# Patient Record
Sex: FEMALE | Race: BLACK OR AFRICAN AMERICAN | NOT HISPANIC OR LATINO | Employment: FULL TIME | URBAN - METROPOLITAN AREA
[De-identification: names, ages, dates, MRNs, and addresses within clinical notes are randomized per-mention and may not be internally consistent; named-entity substitution may affect disease eponyms.]

---

## 2017-04-10 ENCOUNTER — HOSPITAL ENCOUNTER (EMERGENCY)
Facility: HOSPITAL | Age: 22
Discharge: HOME | End: 2017-04-10
Attending: EMERGENCY MEDICINE
Payer: COMMERCIAL

## 2017-04-10 VITALS
RESPIRATION RATE: 18 BRPM | SYSTOLIC BLOOD PRESSURE: 134 MMHG | BODY MASS INDEX: 28.52 KG/M2 | DIASTOLIC BLOOD PRESSURE: 93 MMHG | HEIGHT: 62 IN | OXYGEN SATURATION: 100 % | HEART RATE: 84 BPM | TEMPERATURE: 98 F | WEIGHT: 155 LBS

## 2017-04-10 DIAGNOSIS — R10.9 ABDOMINAL PAIN, UNSPECIFIED LOCATION: Primary | ICD-10-CM

## 2017-04-10 PROCEDURE — 99284 EMERGENCY DEPT VISIT MOD MDM: CPT | Mod: ,,, | Performed by: PHYSICIAN ASSISTANT

## 2017-04-10 PROCEDURE — 99283 EMERGENCY DEPT VISIT LOW MDM: CPT

## 2017-04-10 NOTE — ED AVS SNAPSHOT
[facility]  [address]  [facility] [address]               Tammie Child   4/10/2017  8:02 PM   ED    Description:  Female : 1995   Department:  [facility]           Your Care was Coordinated By:     Provider Role From To    Mike Barlow MD Attending Provider 04/10/17 2021 --    Veronika Carter PA-C Physician Assistant 04/10/17 2020 --      Reason for Visit     Abdominal Pain           Diagnoses this Visit        Comments    Abdominal pain, unspecified location    -  Primary       ED Disposition     None           To Do List           Follow-up Information     Follow up with [facility]. Schedule an appointment as soon as possible for a visit in 1 week.    Specialty:  Internal Medicine    Contact information:    [address]  [facility] [id number] [phone]    Additional information:    [facility]      [facility]     [facility] Care [facility] -  Assistance  Unless otherwise directed by your provider, please contact [facility], our nurse care line that is available for  assistance.     Registered nurses in the [facility] provide: appointment scheduling, clinical advisement, health education, and other advisory services.  Call: [phone] (toll free)               Medications           Message regarding Medications     Verify the changes and/or additions to your medication regime listed below are the same as discussed with your clinician today.  If any of these changes or additions are incorrect, please notify your healthcare provider.             Verify that the below list of medications is an accurate representation of the medications you are currently taking.  If none reported, the list may be blank. If incorrect, please contact your healthcare provider. Carry this list with you in case of emergency.          "  Current Medications     albuterol 90 mcg/actuation inhaler Inhale 1-2 puffs into the lungs every 6 (six) hours as needed for Wheezing.           Clinical Reference Information           Your Vitals Were     BP Pulse Temp Resp Height Weight    126/71 (BP Location: Right arm, Patient Position: Sitting) 81 98.3 °F (36.8 °C) (Oral) 18 5' 2" (1.575 m) 70.3 kg (155 lb)    Last Period SpO2 BMI          04/06/2017 98% 28.35 kg/m2        Allergies as of 4/10/2017     No Known Allergies      Immunizations Administered on Date of Encounter - 4/10/2017     None      ED Micro, Lab, POCT     None      ED Imaging Orders     None        Discharge Instructions         Abdominal Pain    Abdominal pain is pain in the stomach or belly area. Everyone has this pain from time to time. In many cases it goes away on its own. But abdominal pain can sometimes be due to a serious problem, such as appendicitis. So its important to know when to seek help.  Causes of abdominal pain  There are many possible causes of abdominal pain. Common causes in adults include:  · Constipation, diarrhea, or gas  · Stomach acid flowing back up into the esophagus (acid reflux or heartburn)  · Severe acid reflux, called GERD (gastroesophageal reflux disease)  · A sore in the lining of the stomach or small intestine (peptic ulcer)  · Inflammation of the gallbladder, liver, or pancreas  · Gallstones or kidney stones  · Appendicitis   · Intestinal blockage   · An internal organ pushing through a muscle or other tissue (hernia)  · Urinary tract infections  · In women, menstrual cramps, fibroids, or endometriosis  · Inflammation or infection of the intestines  Diagnosing the cause of abdominal pain  Your healthcare provider will do a physical exam help find the cause of your pain. If needed, tests will be ordered. Belly pain has many possible causes. So it can be hard to find the reason for your pain. Giving details about your pain can help. Tell your provider " where and when you feel the pain, and what makes it better or worse. Also let your provider know if you have other symptoms such as:  · Fever  · Tiredness  · Upset stomach (nausea)  · Vomiting  · Changes in bathroom habits  Treating abdominal pain  Some causes of pain need emergency medical treatment right away. These include appendicitis or a bowel blockage. Other problems can be treated with rest, fluids, or medicines. Your healthcare provider can give you specific instructions for treatment or self-care based on what is causing your pain.  If you have vomiting or diarrhea, sip water or other clear fluids. When you are ready to eat solid foods again, start with small amounts of easy-to-digest, low-fat foods. These include apple sauce, toast, or crackers.   When to seek medical care  Call 911 or go to the hospital right away if you:  · Cant pass stool and are vomiting  · Are vomiting blood or have bloody diarrhea or black, tarry diarrhea  · Have chest, neck, or shoulder pain  · Feel like you might pass out  · Have pain in your shoulder blades with nausea  · Have sudden, severe belly pain  · Have new, severe pain unlike any you have felt before  · Have a belly that is rigid, hard, and tender to touch  Call your healthcare provider if you have:  · Pain for more than 5 days  · Bloating for more than 2 days  · Diarrhea for more than 5 days  · A fever of 100.4°F (38.0°C) or higher, or as directed by your provider  · Pain that gets worse  · Weight loss for no reason  · Continued lack of appetite  · Blood in your stool  How to prevent abdominal pain  Here are some tips to help prevent abdominal pain:  · Eat smaller amounts of food at one time.  · Avoid greasy, fried, or other high-fat foods.  · Avoid foods that give you gas.  · Exercise regularly.  · Drink plenty of fluids.  To help prevent GERD symptoms:  · Quit smoking.  · Reduce alcohol and certain foods that increase stomach acid.  · Avoid aspirin and  over-the-counter pain and fever medicines (NSAIDS or nonsteroidal anti-inflammatory drugs), if possible  · Lose extra weight.  · Finish eating at least 2 hours before you go to bed or lie down.  · Raise the head of your bed.  Date Last Reviewed: 7/1/2016  © 5408-0918 [employer]. [address] [place] [address]. All rights reserved. This information is not intended as a substitute for professional medical care. Always follow your healthcare professional's instructions.          [facility] Sign-Up     Activating your [facility] account is as easy as 1-2-3!     1) Visit [email or web address], select Sign Up Now, enter this activation code and your date of birth, then select Next.  [email or web address]  Expires: 5/25/2017  8:30 PM      2) Create a username and password to use when you visit [facility] in the future and select a security question in case you lose your password and select Next.    3) Enter your e-mail address and click Sign Up!    Additional Information  If you have questions, please e-mail [email or web address] or call [phone] to talk to our [facility] staff. Remember, [facility] is NOT to be used for urgent needs. For medical emergencies, dial 911.          [facility] complies with applicable Federal civil rights laws and does not discriminate on the basis of race, color, national origin, age, disability, or sex.        Language Assistance Services     ATTENTION: Language assistance services are available, free of charge. Please call [phone].      ATENCIÓN: Si habla español, tiene a bangura disposición servicios gratuitos de asistencia lingüística. Llame al [phone].     CHÚ Ý: N?u b?n nói Ti?ng Vi?t, có các d?ch v? h? tr? ngôn ng? mi?n phí dành cho b?n. G?i s? [phone].

## 2017-04-11 NOTE — ED NOTES
Patient identifiers verified and correct for Tammie Child.    LOC: The patient is awake, alert and oriented x 4. Pt is speaking appropriately, no slurred speech.  APPEARANCE: Patient resting comfortably and in no acute distress. Pt is clean and well groomed. No JVD visible. Pt reports pain level of 0.  SKIN: Skin is warm dry and intact, and color is consistent with ethnicity. No tenting observed and capillary refill <3 seconds. No clubbing noted to nail beds. No breakdown or brusing visible and mucus membranes moist and acyanotic.  MUSCULOSKELETAL: Full range of motion present in all extremities. Hand  equal and leg strength strong +2 bilaterally.  RESPIRATORY: Airway is open and patent. Respirations-unlabored, regular rate, equal bilaterally on inspiration and expiration. No accessory muscle use noted. Lungs clear to auscultation in all fields bilaterally anterior and posterior.   CARDIAC: Patient has regular heart rate and rhythm.  No peripheral edema noted, and patient has no c/o chest pain.  ABDOMEN: Pt denies abdominal pain at this time. Soft with no distention noted - tenderness noted to RLQ. Normoactive bowel sounds X4 quadrants. Pt has no complaints of abnormal bowel movements. Pt reports normal appetite.   NEUROLOGIC: Eyes open spontaneously and facial expression symmetrical. Pt behavior appropriate to situation, and pt follows commands.  Pt reports sensation present in all extremities when touched with a finger. No s/s of ischemic stroke. PERRLA  : Pt c/o burning urination on Saturday, denies complaints of frequency, burning, urgency or blood in the urine at this time.

## 2017-04-11 NOTE — DISCHARGE INSTRUCTIONS
Abdominal Pain    Abdominal pain is pain in the stomach or belly area. Everyone has this pain from time to time. In many cases it goes away on its own. But abdominal pain can sometimes be due to a serious problem, such as appendicitis. So its important to know when to seek help.  Causes of abdominal pain  There are many possible causes of abdominal pain. Common causes in adults include:  · Constipation, diarrhea, or gas  · Stomach acid flowing back up into the esophagus (acid reflux or heartburn)  · Severe acid reflux, called GERD (gastroesophageal reflux disease)  · A sore in the lining of the stomach or small intestine (peptic ulcer)  · Inflammation of the gallbladder, liver, or pancreas  · Gallstones or kidney stones  · Appendicitis   · Intestinal blockage   · An internal organ pushing through a muscle or other tissue (hernia)  · Urinary tract infections  · In women, menstrual cramps, fibroids, or endometriosis  · Inflammation or infection of the intestines  Diagnosing the cause of abdominal pain  Your healthcare provider will do a physical exam help find the cause of your pain. If needed, tests will be ordered. Belly pain has many possible causes. So it can be hard to find the reason for your pain. Giving details about your pain can help. Tell your provider where and when you feel the pain, and what makes it better or worse. Also let your provider know if you have other symptoms such as:  · Fever  · Tiredness  · Upset stomach (nausea)  · Vomiting  · Changes in bathroom habits  Treating abdominal pain  Some causes of pain need emergency medical treatment right away. These include appendicitis or a bowel blockage. Other problems can be treated with rest, fluids, or medicines. Your healthcare provider can give you specific instructions for treatment or self-care based on what is causing your pain.  If you have vomiting or diarrhea, sip water or other clear fluids. When you are ready to eat solid foods again,  start with small amounts of easy-to-digest, low-fat foods. These include apple sauce, toast, or crackers.   When to seek medical care  Call 911 or go to the hospital right away if you:  · Cant pass stool and are vomiting  · Are vomiting blood or have bloody diarrhea or black, tarry diarrhea  · Have chest, neck, or shoulder pain  · Feel like you might pass out  · Have pain in your shoulder blades with nausea  · Have sudden, severe belly pain  · Have new, severe pain unlike any you have felt before  · Have a belly that is rigid, hard, and tender to touch  Call your healthcare provider if you have:  · Pain for more than 5 days  · Bloating for more than 2 days  · Diarrhea for more than 5 days  · A fever of 100.4°F (38.0°C) or higher, or as directed by your provider  · Pain that gets worse  · Weight loss for no reason  · Continued lack of appetite  · Blood in your stool  How to prevent abdominal pain  Here are some tips to help prevent abdominal pain:  · Eat smaller amounts of food at one time.  · Avoid greasy, fried, or other high-fat foods.  · Avoid foods that give you gas.  · Exercise regularly.  · Drink plenty of fluids.  To help prevent GERD symptoms:  · Quit smoking.  · Reduce alcohol and certain foods that increase stomach acid.  · Avoid aspirin and over-the-counter pain and fever medicines (NSAIDS or nonsteroidal anti-inflammatory drugs), if possible  · Lose extra weight.  · Finish eating at least 2 hours before you go to bed or lie down.  · Raise the head of your bed.  Date Last Reviewed: 7/1/2016  © 5329-9156 [employer]. [address] [place] [address], [place] [address]. All rights reserved. This information is not intended as a substitute for professional medical care. Always follow your healthcare professional's instructions.

## 2017-04-11 NOTE — ED TRIAGE NOTES
"Pt states "I took pepto bismo at home and feel better, but I need a note to go back to work." Pt reports N/V, fever, cough with "red" sputum onset Friday 04/07/17. Pt states all symptoms have resolved and has no complaints at this time.  "

## 2017-04-11 NOTE — ED PROVIDER NOTES
Encounter Date: 4/10/2017    SCRIBE #1 NOTE: IMin, am scribing for, and in the presence of, Veronika Carter PA-C.       History     Chief Complaint   Patient presents with    Abdominal Pain     Review of patient's allergies indicates:  No Known Allergies  HPI Comments: Time seen by provider: 8:17 PM    This is a 22 y.o. female with no pertinent PMH who presents with complaint of subsiding periumbilical abdominal pain with onset 3 days ago. Pt states that she did have n/v/d 2 days ago that had improved by the following day after taking Pepto Bismal. Pt states only having minimal abdominal pain at this time, but is now tolerating PO and fluids well. Pt states that she presents to the ER now because she cannot return to work without a note. Pt denies any other complaints. She notes LMNP on 4/06/2017.  Due to above symptoms patient did present to the ED.      The history is provided by the patient.     History reviewed. No pertinent past medical history.  Past Surgical History:   Procedure Laterality Date    BREAST BIOPSY       History reviewed. No pertinent family history.  Social History   Substance Use Topics    Smoking status: Never Smoker    Smokeless tobacco: None    Alcohol use No     Review of Systems   Constitutional: Negative for fever.   HENT: Negative for congestion.    Eyes: Negative for visual disturbance.   Respiratory: Negative for shortness of breath.    Cardiovascular: Negative for chest pain.   Gastrointestinal: Negative for abdominal pain.   Genitourinary: Negative for difficulty urinating.   Musculoskeletal: Negative for neck pain.   Skin: Negative for pallor.   Neurological: Negative for headaches.       Physical Exam   Initial Vitals   BP Pulse Resp Temp SpO2   04/10/17 1741 04/10/17 1741 04/10/17 1741 04/10/17 1741 04/10/17 1741   126/71 81 18 98.3 °F (36.8 °C) 98 %     Physical Exam    Nursing note and vitals reviewed.  Constitutional: She appears well-developed and well-nourished.  She is not diaphoretic. No distress.   HENT:   Mouth/Throat: Oropharynx is clear and moist. No oropharyngeal exudate.   Eyes: EOM are normal. Pupils are equal, round, and reactive to light. No scleral icterus.   Neck: Normal range of motion. Neck supple. No thyromegaly present. No tracheal deviation present.   Cardiovascular: Normal rate, regular rhythm and normal heart sounds. Exam reveals no gallop and no friction rub.    No murmur heard.  Pulmonary/Chest: Breath sounds normal. No stridor. She has no wheezes. She has no rhonchi. She has no rales.   Abdominal: Soft. There is no tenderness. There is no rebound and no guarding.   Musculoskeletal: She exhibits no edema.   Neurological: She is alert and oriented to person, place, and time. She has normal strength. No cranial nerve deficit or sensory deficit.   Skin: Skin is warm and dry. No rash noted.   Psychiatric: She has a normal mood and affect. Thought content normal.         ED Course   Procedures  Labs Reviewed - No data to display          Medical Decision Making:   History:   Old Medical Records: I decided to obtain old medical records.       APC / Resident Notes:   This is a 22 y.o. female with no pertinent PMH who presents with complaint of subsiding periumbilical abdominal pain with onset 3 days ago.  Physical exam reveals female in no acute distress.  Heart regular rate and rhythm.  Lungs clear to auscultation bilaterally.  Abdomen soft nontender, nondistended.  Patient states abdominal pain has resolved and is no longer having nausea, vomiting, or diarrhea.  Patient will be given a work note stating that she can return to work tomorrow evening.  Plan of treatment discussed with attending physician and he is agreeable.       Scribe Attestation:   Scribe #1: I performed the above scribed service and the documentation accurately describes the services I performed. I attest to the accuracy of the note.    Attending Attestation:           Physician  Attestation for Scribe:  Physician Attestation Statement for Scribe #1: I, Veronika Carter PA-C, reviewed documentation, as scribed by Min James in my presence, and it is both accurate and complete.                 ED Course     Clinical Impression:   The encounter diagnosis was Abdominal pain, unspecified location.    Disposition:   Disposition: Discharged  Condition: Stable       Veronika Carter PA-C  04/10/17 8270       Veronika Carter PA-C  04/10/17 2940

## 2018-10-11 ENCOUNTER — HOSPITAL ENCOUNTER (EMERGENCY)
Facility: HOSPITAL | Age: 23
Discharge: HOME | End: 2018-10-12
Attending: EMERGENCY MEDICINE
Payer: COMMERCIAL

## 2018-10-11 VITALS
SYSTOLIC BLOOD PRESSURE: 123 MMHG | TEMPERATURE: 98 F | HEART RATE: 86 BPM | OXYGEN SATURATION: 98 % | DIASTOLIC BLOOD PRESSURE: 88 MMHG | RESPIRATION RATE: 17 BRPM
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
## 2018-10-11 DIAGNOSIS — T14.8XXA MUSCLE STRAIN: ICD-10-CM

## 2018-10-11 PROCEDURE — 99281 EMR DPT VST MAYX REQ PHY/QHP: CPT

## 2018-10-11 PROCEDURE — 99282 EMERGENCY DEPT VISIT SF MDM: CPT | Mod: ,,, | Performed by: EMERGENCY MEDICINE
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
## 2019-01-06 ENCOUNTER — HOSPITAL ENCOUNTER (EMERGENCY)
Facility: HOSPITAL | Age: 24
Discharge: HOME | End: 2019-01-06
Attending: EMERGENCY MEDICINE
Payer: COMMERCIAL

## 2019-01-06 VITALS
OXYGEN SATURATION: 99 % | BODY MASS INDEX: 29.44 KG/M2 | HEIGHT: 62 IN | WEIGHT: 160 LBS | HEART RATE: 81 BPM | TEMPERATURE: 98 F | DIASTOLIC BLOOD PRESSURE: 82 MMHG | RESPIRATION RATE: 20 BRPM | SYSTOLIC BLOOD PRESSURE: 128 MMHG

## 2019-01-06 DIAGNOSIS — H00.011 HORDEOLUM EXTERNUM OF RIGHT UPPER EYELID: Primary | ICD-10-CM

## 2019-01-06 PROCEDURE — 99284 EMERGENCY DEPT VISIT MOD MDM: CPT | Mod: ,,, | Performed by: EMERGENCY MEDICINE

## 2019-01-06 PROCEDURE — 25000003 PHARM REV CODE 250: Performed by: EMERGENCY MEDICINE

## 2019-01-06 PROCEDURE — 99284 PR EMERGENCY DEPT VISIT,LEVEL IV: ICD-10-PCS | Mod: ,,, | Performed by: EMERGENCY MEDICINE

## 2019-01-06 PROCEDURE — 99284 EMERGENCY DEPT VISIT MOD MDM: CPT

## 2019-01-06 RX ORDER — CLINDAMYCIN HYDROCHLORIDE 150 MG/1
600 CAPSULE ORAL
Status: COMPLETED | OUTPATIENT
Start: 2019-01-06 | End: 2019-01-06

## 2019-01-06 RX ORDER — POLYMYXIN B SULFATE AND TRIMETHOPRIM 1; 10000 MG/ML; [USP'U]/ML
1 SOLUTION OPHTHALMIC EVERY 4 HOURS
Status: DISCONTINUED | OUTPATIENT
Start: 2019-01-06 | End: 2019-01-06 | Stop reason: HOSPADM

## 2019-01-06 RX ORDER — POLYMYXIN B SULFATE AND TRIMETHOPRIM 1; 10000 MG/ML; [USP'U]/ML
1 SOLUTION OPHTHALMIC EVERY 6 HOURS
Qty: 1 BOTTLE | Refills: 0 | Status: SHIPPED | OUTPATIENT
Start: 2019-01-06 | End: 2022-06-13

## 2019-01-06 RX ORDER — CLINDAMYCIN HYDROCHLORIDE 300 MG/1
300 CAPSULE ORAL EVERY 8 HOURS
Qty: 30 CAPSULE | Refills: 0 | Status: SHIPPED | OUTPATIENT
Start: 2019-01-06 | End: 2022-06-13

## 2019-01-06 RX ADMIN — CLINDAMYCIN HYDROCHLORIDE 600 MG: 150 CAPSULE ORAL at 01:01

## 2019-01-06 RX ADMIN — POLYMYXIN B SULFATE, TRIMETHOPRIM SULFATE 1 DROP: 10000; 1 SOLUTION/ DROPS OPHTHALMIC at 02:01

## 2019-01-06 NOTE — ED PROVIDER NOTES
"Encounter Date: 1/6/2019    SCRIBE #1 NOTE: I, Elizabeth Blake, am scribing for, and in the presence of,  Dr. Tapia. I have scribed the entire note.     I, Dr. Nicko Tapia, personally performed the services described in this documentation. All medical record entries made by the scribe were at my direction and in my presence.  I have reviewed the chart and agree that the record reflects my personal performance and is accurate and complete.  Nicko Tapia MD.  6:04 AM 01/07/2019      History     Chief Complaint   Patient presents with    Eye Pain     pt has eye pain x 3 nights with yellow drainage and swelling noted to the right eye. pt also complains of abnormal vision and is aox 4.     Time patient was seen by the provider: 1:44 AM      The patient is a 23 y.o. female with no known co-morbidities, who presents to the ED with a complaint of right eye pain and swelling for 3 days. Patient reports her eye has been watery with mild "crust". She notes mild discomfort to left eye since this morning.       The history is provided by the patient.     Review of patient's allergies indicates:  No Known Allergies  No past medical history on file.  Past Surgical History:   Procedure Laterality Date    BREAST BIOPSY       No family history on file.  Social History     Tobacco Use    Smoking status: Never Smoker    Smokeless tobacco: Never Used   Substance Use Topics    Alcohol use: No    Drug use: No     Review of Systems   Eyes: Positive for pain (right x3 days, left x1 day) and discharge ("crust").        Right eye swelling.    Respiratory: Negative for shortness of breath.    Cardiovascular: Negative for chest pain.   All other systems reviewed and are negative.      Physical Exam     Initial Vitals [01/06/19 0126]   BP Pulse Resp Temp SpO2   128/82 81 20 98 °F (36.7 °C) 99 %      MAP       --         Physical Exam    Nursing note and vitals reviewed.  Constitutional: She appears well-developed and well-nourished.   HENT: "   Head: Normocephalic and atraumatic.   Eyes: Conjunctivae and EOM are normal. Pupils are equal, round, and reactive to light.   Right eye: Stye to medial aspect of upper eyelid. Cornea normal.    Neck: Normal range of motion. Neck supple. No tracheal deviation present.   Cardiovascular: Normal rate, regular rhythm, normal heart sounds and intact distal pulses.   Pulmonary/Chest: Breath sounds normal. No respiratory distress. She has no wheezes. She has no rhonchi. She has no rales. She exhibits no tenderness.   Abdominal: Soft. Bowel sounds are normal. She exhibits no distension and no mass. There is no tenderness. There is no rebound and no guarding.   Musculoskeletal: Normal range of motion. She exhibits no edema or tenderness.   Neurological: She is alert and oriented to person, place, and time. She has normal strength and normal reflexes. She displays normal reflexes. No cranial nerve deficit or sensory deficit.   Skin: Skin is warm and dry. Capillary refill takes less than 2 seconds.   Psychiatric: She has a normal mood and affect. Her behavior is normal. Judgment and thought content normal.         ED Course   Procedures  Labs Reviewed - No data to display       Imaging Results    None          Medical Decision Making:   History:   Old Medical Records: I decided to obtain old medical records.  ED Management:  Because of reported ocular discharge new patient was started on of the mild optimal logic bacterial eyedrops, and because of which she is reporting is a gradual increase in her stye she will be started on medicine to help decrease the risk of orbital cellulitis.  She was told to follow up with primary care physician for further assessment evaluation and return to the ER for any acute negative symptoms for the patient verbalized understanding of this medical plan and agreed            Scribe Attestation:   Scribe #1: I performed the above scribed service and the documentation accurately describes the  services I performed. I attest to the accuracy of the note.               Clinical Impression:   The encounter diagnosis was Hordeolum externum of right upper eyelid.      Disposition:   Disposition: Discharged  Condition: Stable                        Romeo Tapia MD  01/07/19 0604       Romeo Tapia MD  01/07/19 0604

## 2020-11-19 ENCOUNTER — HOSPITAL ENCOUNTER (EMERGENCY)
Facility: HOSPITAL | Age: 25
Discharge: HOME | End: 2020-11-19
Attending: EMERGENCY MEDICINE

## 2020-11-19 VITALS
HEIGHT: 61 IN | DIASTOLIC BLOOD PRESSURE: 88 MMHG | BODY MASS INDEX: 31.97 KG/M2 | WEIGHT: 169.31 LBS | TEMPERATURE: 97 F | SYSTOLIC BLOOD PRESSURE: 131 MMHG | HEART RATE: 74 BPM | OXYGEN SATURATION: 100 % | RESPIRATION RATE: 14 BRPM

## 2020-11-19 DIAGNOSIS — R10.9 ABDOMINAL PAIN, UNSPECIFIED ABDOMINAL LOCATION: ICD-10-CM

## 2020-11-19 DIAGNOSIS — R19.7 DIARRHEA, UNSPECIFIED TYPE: Primary | ICD-10-CM

## 2020-11-19 LAB
ALBUMIN SERPL BCP-MCNC: 3.7 G/DL (ref 3.5–5.2)
ALP SERPL-CCNC: 88 U/L (ref 55–135)
ALT SERPL W/O P-5'-P-CCNC: 14 U/L (ref 10–44)
ANION GAP SERPL CALC-SCNC: 8 MMOL/L (ref 8–16)
AST SERPL-CCNC: 17 U/L (ref 10–40)
B-HCG UR QL: NEGATIVE
BASOPHILS # BLD AUTO: 0.06 K/UL (ref 0–0.2)
BASOPHILS NFR BLD: 0.7 % (ref 0–1.9)
BILIRUB SERPL-MCNC: 0.3 MG/DL (ref 0.1–1)
BILIRUB UR QL STRIP: NEGATIVE
BUN SERPL-MCNC: 9 MG/DL (ref 6–20)
CALCIUM SERPL-MCNC: 8.9 MG/DL (ref 8.7–10.5)
CHLORIDE SERPL-SCNC: 108 MMOL/L (ref 95–110)
CLARITY UR REFRACT.AUTO: ABNORMAL
CO2 SERPL-SCNC: 26 MMOL/L (ref 23–29)
COLOR UR AUTO: YELLOW
CREAT SERPL-MCNC: 0.8 MG/DL (ref 0.5–1.4)
CTP QC/QA: YES
DIFFERENTIAL METHOD: ABNORMAL
EOSINOPHIL # BLD AUTO: 0.1 K/UL (ref 0–0.5)
EOSINOPHIL NFR BLD: 1.4 % (ref 0–8)
ERYTHROCYTE [DISTWIDTH] IN BLOOD BY AUTOMATED COUNT: 14.5 % (ref 11.5–14.5)
EST. GFR  (AFRICAN AMERICAN): >60 ML/MIN/1.73 M^2
EST. GFR  (NON AFRICAN AMERICAN): >60 ML/MIN/1.73 M^2
GLUCOSE SERPL-MCNC: 89 MG/DL (ref 70–110)
GLUCOSE UR QL STRIP: NEGATIVE
HCT VFR BLD AUTO: 35.5 % (ref 37–48.5)
HGB BLD-MCNC: 10.6 G/DL (ref 12–16)
HGB UR QL STRIP: NEGATIVE
IMM GRANULOCYTES # BLD AUTO: 0.02 K/UL (ref 0–0.04)
IMM GRANULOCYTES NFR BLD AUTO: 0.2 % (ref 0–0.5)
KETONES UR QL STRIP: NEGATIVE
LEUKOCYTE ESTERASE UR QL STRIP: NEGATIVE
LIPASE SERPL-CCNC: 55 U/L (ref 4–60)
LYMPHOCYTES # BLD AUTO: 3.2 K/UL (ref 1–4.8)
LYMPHOCYTES NFR BLD: 34.4 % (ref 18–48)
MAGNESIUM SERPL-MCNC: 1.9 MG/DL (ref 1.6–2.6)
MCH RBC QN AUTO: 25.7 PG (ref 27–31)
MCHC RBC AUTO-ENTMCNC: 29.9 G/DL (ref 32–36)
MCV RBC AUTO: 86 FL (ref 82–98)
MONOCYTES # BLD AUTO: 0.6 K/UL (ref 0.3–1)
MONOCYTES NFR BLD: 6.3 % (ref 4–15)
NEUTROPHILS # BLD AUTO: 5.2 K/UL (ref 1.8–7.7)
NEUTROPHILS NFR BLD: 57 % (ref 38–73)
NITRITE UR QL STRIP: NEGATIVE
NRBC BLD-RTO: 0 /100 WBC
PH UR STRIP: 5 [PH] (ref 5–8)
PLATELET # BLD AUTO: 453 K/UL (ref 150–350)
PMV BLD AUTO: 9.7 FL (ref 9.2–12.9)
POTASSIUM SERPL-SCNC: 3.6 MMOL/L (ref 3.5–5.1)
PROT SERPL-MCNC: 7.3 G/DL (ref 6–8.4)
PROT UR QL STRIP: NEGATIVE
RBC # BLD AUTO: 4.12 M/UL (ref 4–5.4)
SODIUM SERPL-SCNC: 142 MMOL/L (ref 136–145)
SP GR UR STRIP: 1.02 (ref 1–1.03)
URN SPEC COLLECT METH UR: ABNORMAL
WBC # BLD AUTO: 9.16 K/UL (ref 3.9–12.7)

## 2020-11-19 PROCEDURE — 83690 ASSAY OF LIPASE: CPT

## 2020-11-19 PROCEDURE — 99283 EMERGENCY DEPT VISIT LOW MDM: CPT

## 2020-11-19 PROCEDURE — 85025 COMPLETE CBC W/AUTO DIFF WBC: CPT

## 2020-11-19 PROCEDURE — 99282 PR EMERGENCY DEPT VISIT,LEVEL II: ICD-10-PCS | Mod: ,,, | Performed by: EMERGENCY MEDICINE

## 2020-11-19 PROCEDURE — 81003 URINALYSIS AUTO W/O SCOPE: CPT

## 2020-11-19 PROCEDURE — 81025 URINE PREGNANCY TEST: CPT | Performed by: EMERGENCY MEDICINE

## 2020-11-19 PROCEDURE — 99282 EMERGENCY DEPT VISIT SF MDM: CPT | Mod: ,,, | Performed by: EMERGENCY MEDICINE

## 2020-11-19 PROCEDURE — 83735 ASSAY OF MAGNESIUM: CPT

## 2020-11-19 PROCEDURE — 80053 COMPREHEN METABOLIC PANEL: CPT

## 2020-11-19 NOTE — Clinical Note
"Tammie"Leticia Child was seen and treated in our emergency department on 11/19/2020.     COVID-19 is present in our communities across the state. There is limited testing for COVID at this time, so not all patients can be tested. In this situation, your employee meets the following criteria:    Tammie Child has met the criteria for COVID-19 testing and has a NEGATIVE result. The employee can return to work once they are asymptomatic for 72 hours without the use of fever reducing medications (Tylenol, Motrin, etc).     If you have any questions or concerns, or if I can be of further assistance, please do not hesitate to contact me.    Sincerely,             Quin SAL RN PA-C"

## 2020-11-19 NOTE — Clinical Note
"Tammie Conley" Mateusz was seen and treated in our emergency department on 11/19/2020.  She may return to work on 11/21/2020.       If you have any questions or concerns, please don't hesitate to call.       RN    "

## 2020-11-19 NOTE — ED NOTES
Patient identifiers verified and correct for Tammie Child   LOC: The patient is awake, alert and aware of environment with an appropriate affect, the patient is oriented x 3 and speaking appropriately.   APPEARANCE: Patient appears comfortable and in no acute distress, patient is clean and well groomed.  SKIN: The skin is warm and dry, color consistent with ethnicity, patient has normal skin turgor and moist mucus membranes, skin intact, no breakdown or bruising noted.   MUSCULOSKELETAL: Patient moving all extremities spontaneously, no swelling noted.  RESPIRATORY: Airway is open and patent, respirations are spontaneous, patient has a normal effort and rate, no accessory muscle use noted, pt placed on continuous pulse ox with O2 sats noted at 97% on room air.  CARDIAC: Pt placed on cardiac monitor. Patient has a normal rate and regular rhythm, no edema noted, capillary refill < 3 seconds.   GASTRO: Soft and non tender to palpation, no distention noted, normoactive bowel sounds present in all four quadrants. Pt states bowel movements have been regular. Pt reports abdominal pain and emesis x2 days  : Pt denies any pain or frequency with urination.  NEURO: Pt opens eyes spontaneously, behavior appropriate to situation, follows commands, facial expression symmetrical, bilateral hand grasp equal and even, purposeful motor response noted, normal sensation in all extremities when touched with a finger.

## 2020-11-19 NOTE — ED NOTES
Rounding on the patient has been done. The patient has been updated on the plan of care and current status. Pain was assessed and is currently a 0/10. Comfort positioning and restroom needs were addressed. Necessary items were placed with in reach and was advised when a reassessment would take place. The call bell remains at the bedside for any additional patient needs. The patient is resting comfortably on the recliner in locked locked position, respirations are even and unlabored, skin warm and dry. Will continue to monitor.

## 2020-11-19 NOTE — Clinical Note
"Tammie"Leticia Child was seen and treated in our emergency department on 11/19/2020.     COVID-19 is present in our communities across the state. There is limited testing for COVID at this time, so not all patients can be tested. In this situation, your employee meets the following criteria:    Tammie Child has met the criteria for COVID-19 testing and has a NEGATIVE result. The employee can return to work once they are asymptomatic for 72 hours without the use of fever reducing medications (Tylenol, Motrin, etc).     If you have any questions or concerns, or if I can be of further assistance, please do not hesitate to contact me.    Sincerely,              RN"

## 2020-11-19 NOTE — DISCHARGE INSTRUCTIONS
You may take Imodium as needed for diarrhea. Hydrate by drinking plenty of water. Return to the emergency room for worsening abdominal pain or other concerning symptoms.

## 2020-11-19 NOTE — ED PROVIDER NOTES
"Encounter Date: 11/19/2020       History     Chief Complaint   Patient presents with    Abdominal Pain     vomiting for few days     The history is provided by the patient. No  was used.      Tammie Child is a 25 y.o. female with no reported medical history presenting to the ED with the chief complaint of abdominal pain. Patient reports 3 days of lower abdominal pain and diarrhea. Estimates to have 3-5 episodes of watery, green colored stools per day. No melena or hematochezia. Reports having relief of the abdominal pain after a bowel movement. She is not having emesis despite triage documentation (patient thought vomiting meant "going to the bathroom"). Reports LMP ended 2 days ago. Denies vaginal discharge, vaginal bleeding, concerns for STD. She does not take daily medications. No recent ABx use or history of C diff infections. 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 She is concerned about having to use the restroom constantly while at work and requesting work excuse. No fever, sinus congestion, anosmia, sore throat, chest pain, SOB, cough, dysuria, hematuria.       Review of patient's allergies indicates:  No Known Allergies  History reviewed. No pertinent past medical history.  Past Surgical History:   Procedure Laterality Date    BREAST BIOPSY       History reviewed. No pertinent family history.  Social History     Tobacco Use    Smoking status: Never Smoker    Smokeless tobacco: Never Used   Substance Use Topics    Alcohol use: No    Drug use: No     Review of Systems   Constitutional: Negative for chills, diaphoresis and fever.   HENT: Negative for sore throat.    Eyes: Negative for pain and redness.   Respiratory: Negative for shortness of breath.    Cardiovascular: Negative for chest pain.   Gastrointestinal: Positive for abdominal pain and diarrhea. Negative for blood in stool and nausea.   Genitourinary: Negative for dysuria.   Musculoskeletal: " Negative for back pain.   Skin: Negative for rash.   Neurological: Negative for weakness, light-headedness and headaches.   Hematological: Does not bruise/bleed easily.       Physical Exam     Initial Vitals [11/19/20 0707]   BP Pulse Resp Temp SpO2   (!) 142/71 94 18 97.6 °F (36.4 °C) 97 %      MAP       --         Physical Exam    Constitutional: She appears well-developed and well-nourished. She is not diaphoretic. No distress.   HENT:   Head: Normocephalic and atraumatic.   Mouth/Throat: Oropharynx is clear and moist. No oropharyngeal exudate.   Eyes: EOM are normal. Pupils are equal, round, and reactive to light.   Neck: Normal range of motion. Neck supple.   Cardiovascular: Normal rate and regular rhythm.   Pulmonary/Chest: Breath sounds normal. No respiratory distress. She has no wheezes.   Abdominal: Soft. She exhibits no distension. There is no abdominal tenderness.   No CVA tenderness   Musculoskeletal: Normal range of motion. No tenderness or edema.   Neurological: She is alert and oriented to person, place, and time. She has normal strength. No cranial nerve deficit or sensory deficit.   Skin: Skin is warm and dry. No rash noted. No erythema.         ED Course   Procedures  Labs Reviewed   CBC W/ AUTO DIFFERENTIAL - Abnormal; Notable for the following components:       Result Value    Hemoglobin 10.6 (*)     Hematocrit 35.5 (*)     MCH 25.7 (*)     MCHC 29.9 (*)     Platelets 453 (*)     All other components within normal limits   URINALYSIS, REFLEX TO URINE CULTURE - Abnormal; Notable for the following components:    Appearance, UA Hazy (*)     All other components within normal limits    Narrative:     Specimen Source->Urine   COMPREHENSIVE METABOLIC PANEL   LIPASE   MAGNESIUM   POCT URINE PREGNANCY          Imaging Results    None          Medical Decision Making:   History:   Old Medical Records: I decided to obtain old medical records.  Old Records Summarized: records from clinic visits.  Clinical  Tests:   Lab Tests: Ordered and Reviewed       APC / Resident Notes:   25 y.o. female with no reported medical history presenting to the ED c/o 3 days of lower abdominal pain and 3-5 episodes of watery, green stools per day. No emesis despite triage documentation. Concerned about having to use the restroom while at work and requesting work excuse. DDx includes but not limited to viral gastroenteritis, food borne toxin, infectious diarrhea, electrolyte disturbance, dehydration. No abdominal tenderness on exam and feel appendicitis less likely. No red flags concerning for C diff.      No significant findings on laboratory work-up. Abdominal exam benign. Suspect her diarrhea to be self-limiting. Advised patient to hydrate and to use OTC Imodium as needed. She is requesting a work excuse which was provided. Advised patient to return to the ED for worsening abdominal pain or other worsening symptoms. Patient expresses understanding and agreeable to the plan. I have discussed the care of this patient with my supervising physician.                        Clinical Impression:     ICD-10-CM ICD-9-CM   1. Diarrhea, unspecified type  R19.7 787.91   2. Abdominal pain, unspecified abdominal location  R10.9 789.00                      Disposition:   Disposition: Discharged  Condition: Stable     ED Disposition Condition    Discharge Stable        ED Prescriptions     None        Follow-up Information     Follow up With Specialties Details Why Contact Info Additional Information    [facility]   [address]  [facility] [id number] [phone] [facility] Please park in surface lot and check in at central registration desk                                       Pancho Her PA-C  11/19/20 0987

## 2020-11-19 NOTE — Clinical Note
"Tammie "Leticia Child was seen and treated in our emergency department on 11/19/2020.     COVID-19 is present in our communities across the state. There is limited testing for COVID at this time, so not all patients can be tested. In this situation, your employee meets the following criteria:    Tammie Child has met the criteria for COVID-19 testing and has a NEGATIVE result. The employee can return to work once they are asymptomatic for 72 hours without the use of fever reducing medications (Tylenol, Motrin, etc).     If you have any questions or concerns, or if I can be of further assistance, please do not hesitate to contact me.    Sincerely,             Quin SAL RN RN"

## 2021-10-27 ENCOUNTER — HOSPITAL ENCOUNTER (EMERGENCY)
Facility: HOSPITAL | Age: 26
Discharge: HOME | End: 2021-10-27
Attending: EMERGENCY MEDICINE
Payer: OTHER GOVERNMENT

## 2021-10-27 VITALS
HEART RATE: 76 BPM | RESPIRATION RATE: 18 BRPM | SYSTOLIC BLOOD PRESSURE: 135 MMHG | TEMPERATURE: 98 F | DIASTOLIC BLOOD PRESSURE: 84 MMHG | OXYGEN SATURATION: 96 %

## 2021-10-27 DIAGNOSIS — R05.9 COUGH: ICD-10-CM

## 2021-10-27 DIAGNOSIS — N64.4 BREAST PAIN: Primary | ICD-10-CM

## 2021-10-27 DIAGNOSIS — J06.9 URI WITH COUGH AND CONGESTION: ICD-10-CM

## 2021-10-27 LAB
CTP QC/QA: YES
SARS-COV-2 RDRP RESP QL NAA+PROBE: NEGATIVE

## 2021-10-27 PROCEDURE — 99282 EMERGENCY DEPT VISIT SF MDM: CPT | Mod: 25

## 2021-10-27 PROCEDURE — U0002 COVID-19 LAB TEST NON-CDC: HCPCS | Performed by: EMERGENCY MEDICINE

## 2021-10-27 PROCEDURE — 99282 EMERGENCY DEPT VISIT SF MDM: CPT | Mod: CS,,, | Performed by: EMERGENCY MEDICINE

## 2021-10-27 PROCEDURE — 99282 PR EMERGENCY DEPT VISIT,LEVEL II: ICD-10-PCS | Mod: CS,,, | Performed by: EMERGENCY MEDICINE

## 2022-06-13 ENCOUNTER — HOSPITAL ENCOUNTER (EMERGENCY)
Facility: HOSPITAL | Age: 27
Discharge: HOME | End: 2022-06-13
Attending: EMERGENCY MEDICINE

## 2022-06-13 VITALS
OXYGEN SATURATION: 99 % | RESPIRATION RATE: 18 BRPM | TEMPERATURE: 100 F | SYSTOLIC BLOOD PRESSURE: 131 MMHG | HEIGHT: 61 IN | HEART RATE: 100 BPM | WEIGHT: 167 LBS | DIASTOLIC BLOOD PRESSURE: 81 MMHG | BODY MASS INDEX: 31.53 KG/M2

## 2022-06-13 DIAGNOSIS — R07.89 CHEST DISCOMFORT: ICD-10-CM

## 2022-06-13 DIAGNOSIS — U07.1 COVID-19: Primary | ICD-10-CM

## 2022-06-13 LAB
CTP QC/QA: YES
POC MOLECULAR INFLUENZA A AGN: NEGATIVE
POC MOLECULAR INFLUENZA B AGN: NEGATIVE
SARS-COV-2 RNA RESP QL NAA+PROBE: DETECTED

## 2022-06-13 PROCEDURE — 93010 ELECTROCARDIOGRAM REPORT: CPT | Mod: ,,, | Performed by: INTERNAL MEDICINE

## 2022-06-13 PROCEDURE — U0003 INFECTIOUS AGENT DETECTION BY NUCLEIC ACID (DNA OR RNA); SEVERE ACUTE RESPIRATORY SYNDROME CORONAVIRUS 2 (SARS-COV-2) (CORONAVIRUS DISEASE [COVID-19]), AMPLIFIED PROBE TECHNIQUE, MAKING USE OF HIGH THROUGHPUT TECHNOLOGIES AS DESCRIBED BY CMS-2020-01-R: HCPCS | Performed by: EMERGENCY MEDICINE

## 2022-06-13 PROCEDURE — 93005 ELECTROCARDIOGRAM TRACING: CPT

## 2022-06-13 PROCEDURE — U0005 INFEC AGEN DETEC AMPLI PROBE: HCPCS | Performed by: EMERGENCY MEDICINE

## 2022-06-13 PROCEDURE — 93010 EKG 12-LEAD: ICD-10-PCS | Mod: ,,, | Performed by: INTERNAL MEDICINE

## 2022-06-13 PROCEDURE — 99284 EMERGENCY DEPT VISIT MOD MDM: CPT | Mod: CR,CS,, | Performed by: EMERGENCY MEDICINE

## 2022-06-13 PROCEDURE — 25000003 PHARM REV CODE 250: Performed by: EMERGENCY MEDICINE

## 2022-06-13 PROCEDURE — 87502 INFLUENZA DNA AMP PROBE: CPT

## 2022-06-13 PROCEDURE — 99284 PR EMERGENCY DEPT VISIT,LEVEL IV: ICD-10-PCS | Mod: CR,CS,, | Performed by: EMERGENCY MEDICINE

## 2022-06-13 PROCEDURE — 99284 EMERGENCY DEPT VISIT MOD MDM: CPT | Mod: 25

## 2022-06-13 RX ORDER — ACETAMINOPHEN 500 MG
1000 TABLET ORAL
Status: COMPLETED | OUTPATIENT
Start: 2022-06-13 | End: 2022-06-13

## 2022-06-13 RX ADMIN — ACETAMINOPHEN 1000 MG: 500 TABLET ORAL at 11:06

## 2022-06-13 NOTE — ED PROVIDER NOTES
SCRIBE #1 NOTE: I, Nirali Martin, am scribing for, and in the presence of,  Andrea Kaba MD. I have scribed the entire note.       Source of History   Patient     Chief Complaint   COVID-19 Concerns (Sister ++, sore throat body and head aches)      History Of Present Illness   Tammie Child is a 27 y.o. female presenting with COVID-19 concerns. She notes she experience headache, and sore throat onset one day ago. She developed generalized body aches and chest pain this AM.  Her sister is COVID-19 positive. Patient reports no other identifying, alleviating, or exacerbating factors and denies shortness of breath or any other associated symptoms at this time.  Review Of Systems   As per HPI and below:    General: No fever.  No chills. (+) sore throat   Eyes: No visual changes.  Head: (+) headache.    Integument: No rashes or lesions.  Chest: No shortness of breath.  Cardiovascular: (+) chest pain.  Abdomen: No abdominal pain.  No nausea or vomiting.  Urinary: No abnormal urination.  Neurologic: No focal weakness.  No numbness.  Hematologic: No easy bruising.  Endocrine: No excessive thirst or urination.  Musk: (+) myalgia     Review of patient's allergies indicates:  No Known Allergies    No current facility-administered medications on file prior to encounter.     Current Outpatient Medications on File Prior to Encounter   Medication Sig Dispense Refill    albuterol 90 mcg/actuation inhaler Inhale 1-2 puffs into the lungs every 6 (six) hours as needed for Wheezing. 1 Inhaler 0    [DISCONTINUED] clindamycin (CLEOCIN) 300 MG capsule Take 1 capsule (300 mg total) by mouth every 8 (eight) hours. 30 capsule 0    [DISCONTINUED] polymyxin B sulf-trimethoprim (POLYTRIM) 10,000 unit- 1 mg/mL Drop Place 1 drop into both eyes every 6 (six) hours. 1 Bottle 0       Past History   As per HPI and below:  No past medical history on file.  Past Surgical History:   Procedure Laterality Date    BREAST BIOPSY         Social  "History     Socioeconomic History    Marital status: Single   Tobacco Use    Smoking status: Never Smoker    Smokeless tobacco: Never Used   Substance and Sexual Activity    Alcohol use: No    Drug use: No       No family history on file.    Physical Exam     Vitals:    06/13/22 1020   BP: 131/81   Pulse: 100   Resp: 18   Temp: 100 °F (37.8 °C)   TempSrc: Oral   SpO2: 99%   Weight: 75.8 kg (167 lb)   Height: 5' 1" (1.549 m)     Appearance:  No acute distress.  Comfortable.  Skin:  No rashes.  Good turgor.  Head: Normocephalic.  Atraumatic.    Eyes: No conjunctival injection.  No swelling.  Chest: Normal work of breathing.  No retractions.  No stridor.  Cardiovascular: Regular rhythm.  No appearance of shock.  No edema.  Abdomen:  No distention.    Musculoskeletal:  No deformities.  Normal range of motion.    Neurologic:  Normal movement and ambulation.  Mental Status:  Alert and oriented x 3.  Appropriate, conversant.      I decided to obtain the patient's medical records.    Medications   acetaminophen tablet 1,000 mg (1,000 mg Oral Given 6/13/22 1131)       Results and Course     Labs Reviewed   SARS-COV-2 (COVID-19) QUALITATIVE PCR - Abnormal; Notable for the following components:       Result Value    SARS-CoV2 (COVID-19) Qualitative PCR Detected (*)     All other components within normal limits    Narrative:     Is the patient symptomatic?->Yes  Is testing needed for patient travel?->No  Is this needed for pre-procedure or pre-op testing?->No   POCT INFLUENZA A/B MOLECULAR       Imaging Results    None        ECG Results          EKG 12-lead (Final result)  Result time 06/13/22 12:31:02    Final result by Interface, Lab In [facility] (06/13/22 12:31:02)                 Narrative:    Test Reason : R07.89,    Vent. Rate : 092 BPM     Atrial Rate : 092 BPM     P-R Int : 152 ms          QRS Dur : 068 ms      QT Int : 342 ms       P-R-T Axes : 064 026 025 degrees     QTc Int : 422 ms    Normal sinus rhythm  Normal " "ECG  No previous ECGs available  Confirmed by RADHA ZUÑIGA MD ([id number]) on 6/13/2022 12:30:55 PM    Referred By: [email or web address]   SELF           Confirmed By:RADHA ZUÑIGA MD                            ED Course as of 06/13/22 1314   Mon Jun 13, 2022   1201 POC Molecular Influenza A Ag: Negative [DC]   1201 POC Molecular Influenza B Ag: Negative [DC]   1201 EKG 12-lead  EKG shows normal sinus rhythm and no acute ischemia per my independent interpretation.     [DC]      ED Course User Index  [DC] Andrea Kaba MD           MDM, Impression and Plan   27 y.o. female with viral syndrome, recent close exposure to COVID.  COVID PCR testing sent.  Flu swab negative.  She had some chest discomfort in the context of her total body aches" and an EKG was normal.  Pain was with movement and inspiration.  I highly doubt ischemia.  I do not think further testing is indicated at this time.  PCR positive.       Final diagnoses:  [R07.89] Chest discomfort  [U07.1] COVID-19 (Primary)          ED Disposition Condition    Discharge Stable        ED Prescriptions     None        Follow-up Information     Follow up With Specialties Details Why Contact Info    Your primary care doctor  In 2 weeks               Andrea Kaba MD  06/13/22 7290    "

## 2022-06-13 NOTE — ED NOTES
Tammie Child, a 27 y.o. female presents to the ED w/ complaint of covid+ contact, reports fever, body aches, reports vaccines.     Triage note:  Chief Complaint   Patient presents with    COVID-19 Concerns     Sister ++, sore throat body and head aches     Review of patient's allergies indicates:  No Known Allergies  No past medical history on file. Patient identifiers verified and correct for   LOC: The patient is awake, alert and aware of environment with an appropriate affect, the patient is oriented x 3 and speaking appropriately.   APPEARANCE: Patient appears comfortable and in no acute distress, patient is clean and well groomed.  SKIN: The skin is warm and dry, color consistent with ethnicity, patient has normal skin turgor and moist mucus membranes, skin intact, no breakdown or bruising noted.   MUSCULOSKELETAL: Patient moving all extremities spontaneously, no swelling noted.  RESPIRATORY: Airway is open and patent, respirations are spontaneous, patient has a normal effort and rate, no accessory muscle use noted,  with O2 sats noted at 97% on room air.  CARDIAC: . Patient has a normal rate and regular rhythm, no edema noted, capillary refill < 3 seconds.   GASTRO: Soft and non tender to palpation, no distention noted, normoactive bowel sounds present in all four quadrants. Pt states bowel movements have been regular.  : Pt denies any pain or frequency with urination.  NEURO: Pt opens eyes spontaneously, behavior appropriate to situation, follows commands, facial expression symmetrical, bilateral hand grasp equal and even, purposeful motor response noted, normal sensation in all extremities when touched with a finger.

## 2022-06-13 NOTE — Clinical Note
"Tammie "Leticia Child was seen and treated in our emergency department on 6/13/2022.     COVID-19 is present in our communities across the state. There is limited testing for COVID at this time, so not all patients can be tested. In this situation, your employee meets the following criteria:    Tammie Child has met the criteria for COVID-19 testing based upon symptoms, travel, and/or potential exposure. The test has been completed and is pending results at this time. During this time the employee is not able to work and should be quarantined per the Centers for Disease Control timelines.     If you have any questions or concerns, or if I can be of further assistance, please do not hesitate to contact me.    Sincerely,             Andrea Kaba MD"
UNRESPONSIVE

## 2022-06-15 PROCEDURE — 93010 ELECTROCARDIOGRAM REPORT: CPT | Mod: ,,, | Performed by: INTERNAL MEDICINE

## 2022-06-15 PROCEDURE — 93010 EKG 12-LEAD: ICD-10-PCS | Mod: ,,, | Performed by: INTERNAL MEDICINE

## 2022-06-15 PROCEDURE — 99283 EMERGENCY DEPT VISIT LOW MDM: CPT

## 2022-06-15 PROCEDURE — 99284 PR EMERGENCY DEPT VISIT,LEVEL IV: ICD-10-PCS | Mod: ,,, | Performed by: EMERGENCY MEDICINE

## 2022-06-15 PROCEDURE — 99284 EMERGENCY DEPT VISIT MOD MDM: CPT | Mod: ,,, | Performed by: EMERGENCY MEDICINE

## 2022-06-15 PROCEDURE — 93005 ELECTROCARDIOGRAM TRACING: CPT

## 2022-06-16 ENCOUNTER — HOSPITAL ENCOUNTER (EMERGENCY)
Facility: HOSPITAL | Age: 27
Discharge: LEFT BEFORE DISCHARGE | End: 2022-06-16
Attending: EMERGENCY MEDICINE

## 2022-06-16 VITALS
SYSTOLIC BLOOD PRESSURE: 121 MMHG | OXYGEN SATURATION: 99 % | TEMPERATURE: 99 F | RESPIRATION RATE: 18 BRPM | WEIGHT: 166 LBS | DIASTOLIC BLOOD PRESSURE: 85 MMHG | HEART RATE: 106 BPM | BODY MASS INDEX: 31.34 KG/M2 | HEIGHT: 61 IN

## 2022-06-16 DIAGNOSIS — R06.02 SOB (SHORTNESS OF BREATH): ICD-10-CM

## 2022-06-16 DIAGNOSIS — R06.02 SHORTNESS OF BREATH: ICD-10-CM

## 2022-06-16 PROCEDURE — 25000003 PHARM REV CODE 250: Performed by: EMERGENCY MEDICINE

## 2022-06-16 RX ORDER — CETIRIZINE HYDROCHLORIDE 5 MG/1
10 TABLET ORAL ONCE
Status: COMPLETED | OUTPATIENT
Start: 2022-06-16 | End: 2022-06-16

## 2022-06-16 RX ORDER — OXYMETAZOLINE HCL 0.05 %
1 SPRAY, NON-AEROSOL (ML) NASAL
Status: COMPLETED | OUTPATIENT
Start: 2022-06-16 | End: 2022-06-16

## 2022-06-16 RX ADMIN — CETIRIZINE HYDROCHLORIDE 10 MG: 5 TABLET, FILM COATED ORAL at 01:06

## 2022-06-16 RX ADMIN — Medication 1 SPRAY: at 01:06

## 2022-06-16 NOTE — ED PROVIDER NOTES
CC: Shortness of Breath (Dx with cv 6/13, says started with difficulty breathing today, 99% room air)      History provided by:   Patient     HPI: Tammie Child is a 27 y.o. year old female who presents to the ED for sob, + congested x today  She started feeling sick with body aches, chest pain and tested positive for Covid on 6/13/22. Not on any treatment for covid  + cough, + sore throat  + chest pain: ant chest, resolved since yesterday  The congestion bothers her, cannot lie flat and sleep and also sob    Took tylenol today, no decongestants            History reviewed. No pertinent past medical history.  Past Surgical History:   Procedure Laterality Date    BREAST BIOPSY       History reviewed. No pertinent family history.  No current facility-administered medications on file prior to encounter.     Current Outpatient Medications on File Prior to Encounter   Medication Sig Dispense Refill    albuterol 90 mcg/actuation inhaler Inhale 1-2 puffs into the lungs every 6 (six) hours as needed for Wheezing. 1 Inhaler 0     Patient has no known allergies.  Social History     Socioeconomic History    Marital status: Single   Tobacco Use    Smoking status: Never Smoker    Smokeless tobacco: Never Used   Substance and Sexual Activity    Alcohol use: No    Drug use: No       ROS:     Constitutional : neg for fever, neg for weakness  HENT neg for head injury, neg for sore throat, pos for congestion  Eyes: neg for visual changes, neg for eye pain  Resp pos for SOB, pos for cough  Cardiac  Chest pain initially when she tested pos for Covid but resolved, neg for palpitations  GI neg for abd pain, neg for nausea, neg for vomiting   neg for urinary changes  Neuro neg for focal weakness or numbness  Skin neg for skin rash  MSK: neg for myalgia, neg for arthralgia  ALL: Patient has no known allergies.    PHYSICAL EXAM:  Vitals:    06/15/22 2344   BP: 121/85   Pulse: 106   Resp: 18   Temp: 98.9 °F (37.2 °C)     VS:  triage VS reviewed    general: comfortable, in no acute distress, pleasant, well nourished  HENT: neck symmetric, trachea midline, + congestion  Eyes: PERRL,no conjunctival injection and symmetrical eyelids  CV: RRR, no  murmurs, no rubs, no gallops, no LE edema  Resp: comfortable breathing, speaks in full sentences, CTAB, no wheezing, no crackles, no ronchi  ABD:  soft, ND, no masses, + normal BS, NT  Renal: No CVAT  Neuro: AAO x 3,  face symmetric, speech normal  MSK: NC/AT  Skin: warm, dry            DATA & INTERVENTIONS:    LABS reviewed:  Labs Reviewed - No data to display    RADIOLOGY reviewed:  Imaging Results          X-Ray Chest AP Portable (Final result)  Result time 06/16/22 02:01:37    Final result by Papito Laurent MD (06/16/22 02:01:37)                 Impression:      No acute findings in the chest.      Electronically signed by: Papito Laurent MD  Date:    06/16/2022  Time:    02:01             Narrative:    EXAMINATION:  XR CHEST AP PORTABLE    CLINICAL HISTORY:  Shortness of breath    TECHNIQUE:  Single frontal view of the chest was performed.    COMPARISON:  02/05/2016.    FINDINGS:  No consolidation, pleural effusion or pneumothorax.    Cardiomediastinal silhouette is unremarkable.                                MEDICATIONS/FLUIDS:  Medications   oxymetazoline 0.05 % nasal spray 1 spray (1 spray Each Nostril Given 6/16/22 0136)   cetirizine tablet 10 mg (10 mg Oral Given 6/16/22 0136)         MDM:  Tammie Child is a 27 y.o. year old female who presents to the ED for congestion, sob, no current chest pain  Low suspicion for PNA, PE. Will give claritin, oxymetazoline, check CXR and ambulatory pulse ox. Will reassess if improved, if not , will consider CTA chest r/o PE      2:46 AM  patient eloped without letting the medical staff know that she is going to leave. I was not able to reassess the patient    IMPRESSION:  1.) congestion, SOB, Covid 19      Dispo: Discharge    Critical Care  Time: N/A       Penelope Ahumada MD  06/16/22 5062

## 2022-06-16 NOTE — ED TRIAGE NOTES
Tammie KELLER Mateusz, a 27 y.o. female presents to the ED w/ complaint of SOB. Dx with Covid 6/13.    Triage note:  Chief Complaint   Patient presents with    Shortness of Breath     Dx with cv 6/13, says started with difficulty breathing today, 99% room air     Review of patient's allergies indicates:  No Known Allergies  No past medical history on file.

## 2022-06-16 NOTE — ED NOTES
Patient identifiers verified and correct for Tammie Child.    LOC: The patient is awake, alert and oriented x 4. Pt is speaking appropriately, no slurred speech.  APPEARANCE: Patient resting comfortably and in no acute distress. Pt is clean and well groomed.   SKIN: Skin is warm dry and intact, and color is consistent with ethnicity.   MUSCULOSKELETAL: Full range of motion present in all extremities. Hand  equal and leg strength strong +2 bilaterally.  RESPIRATORY: Airway is open and patent. Respirations-unlabored, regular rate, equal bilaterally on inspiration and expiration. SOB that started today. Dx with Covid 6/13.   CARDIAC: Patient has regular heart rate and rhythm.  No peripheral edema noted, and patient has no c/o chest pain.  ABDOMEN: Soft and non-tender to palpation with no distention noted.  NEUROLOGIC: Eyes open spontaneously and facial expression symmetrical. Pt behavior appropriate to situation, and pt follows commands.  Pt reports sensation present in all extremities when touched with a finger.   : No complaints of frequency, burning, urgency or blood in the urine.

## 2022-10-26 ENCOUNTER — PATIENT MESSAGE (OUTPATIENT)
Dept: RESEARCH | Facility: HOSPITAL | Age: 27
End: 2022-10-26

## 2025-02-10 ENCOUNTER — HOSPITAL ENCOUNTER (EMERGENCY)
Facility: HOSPITAL | Age: 30
Discharge: LEFT BEFORE DISCHARGE | End: 2025-02-10

## 2025-02-10 VITALS
DIASTOLIC BLOOD PRESSURE: 100 MMHG | WEIGHT: 200 LBS | SYSTOLIC BLOOD PRESSURE: 144 MMHG | BODY MASS INDEX: 37.76 KG/M2 | TEMPERATURE: 98 F | HEART RATE: 95 BPM | OXYGEN SATURATION: 98 % | RESPIRATION RATE: 17 BRPM | HEIGHT: 61 IN

## 2025-02-10 PROCEDURE — 99281 EMR DPT VST MAYX REQ PHY/QHP: CPT

## 2025-02-11 NOTE — FIRST PROVIDER EVALUATION
"Medical screening examination initiated.  I have conducted a focused provider triage encounter, findings are as follows:    Brief history of present illness:  30yo F with N/V and diffuse abdominal pain, not tolerating PO at home since last night.     Vitals:    02/10/25 1938   BP: (!) 144/100   BP Location: Right arm   Pulse: 95   Resp: 17   Temp: 98.2 °F (36.8 °C)   TempSrc: Oral   SpO2: 98%   Weight: 90.7 kg (200 lb)   Height: 5' 1" (1.549 m)       Pertinent physical exam:  well appearing, NAD    Brief workup plan:  abd pain w/u    Preliminary workup initiated; this workup will be continued and followed by the physician or advanced practice provider that is assigned to the patient when roomed.  "

## 2025-04-02 ENCOUNTER — HOSPITAL ENCOUNTER (EMERGENCY)
Facility: HOSPITAL | Age: 30
Discharge: HOME | End: 2025-04-02
Attending: STUDENT IN AN ORGANIZED HEALTH CARE EDUCATION/TRAINING PROGRAM

## 2025-04-02 VITALS
OXYGEN SATURATION: 100 % | TEMPERATURE: 98 F | WEIGHT: 195 LBS | BODY MASS INDEX: 36.82 KG/M2 | DIASTOLIC BLOOD PRESSURE: 97 MMHG | SYSTOLIC BLOOD PRESSURE: 177 MMHG | HEIGHT: 61 IN | RESPIRATION RATE: 16 BRPM | HEART RATE: 108 BPM

## 2025-04-02 DIAGNOSIS — K02.9 PAIN DUE TO DENTAL CARIES: Primary | ICD-10-CM

## 2025-04-02 PROCEDURE — 63600175 PHARM REV CODE 636 W HCPCS: Mod: JZ,TB | Performed by: STUDENT IN AN ORGANIZED HEALTH CARE EDUCATION/TRAINING PROGRAM

## 2025-04-02 PROCEDURE — 99284 EMERGENCY DEPT VISIT MOD MDM: CPT | Mod: 25

## 2025-04-02 PROCEDURE — 25000003 PHARM REV CODE 250: Performed by: STUDENT IN AN ORGANIZED HEALTH CARE EDUCATION/TRAINING PROGRAM

## 2025-04-02 PROCEDURE — 96372 THER/PROPH/DIAG INJ SC/IM: CPT | Performed by: STUDENT IN AN ORGANIZED HEALTH CARE EDUCATION/TRAINING PROGRAM

## 2025-04-02 RX ORDER — HYDROCODONE BITARTRATE AND ACETAMINOPHEN 5; 325 MG/1; MG/1
1 TABLET ORAL EVERY 6 HOURS PRN
Qty: 12 TABLET | Refills: 0 | Status: SHIPPED | OUTPATIENT
Start: 2025-04-02

## 2025-04-02 RX ORDER — AMOXICILLIN AND CLAVULANATE POTASSIUM 875; 125 MG/1; MG/1
1 TABLET, FILM COATED ORAL 2 TIMES DAILY
Qty: 14 TABLET | Refills: 0 | Status: SHIPPED | OUTPATIENT
Start: 2025-04-02

## 2025-04-02 RX ORDER — HYDROCODONE BITARTRATE AND ACETAMINOPHEN 5; 325 MG/1; MG/1
1 TABLET ORAL
Refills: 0 | Status: COMPLETED | OUTPATIENT
Start: 2025-04-02 | End: 2025-04-02

## 2025-04-02 RX ORDER — KETOROLAC TROMETHAMINE 30 MG/ML
15 INJECTION, SOLUTION INTRAMUSCULAR; INTRAVENOUS
Status: COMPLETED | OUTPATIENT
Start: 2025-04-02 | End: 2025-04-02

## 2025-04-02 RX ADMIN — HYDROCODONE BITARTRATE AND ACETAMINOPHEN 1 TABLET: 5; 325 TABLET ORAL at 05:04

## 2025-04-02 RX ADMIN — KETOROLAC TROMETHAMINE 15 MG: 30 INJECTION, SOLUTION INTRAMUSCULAR; INTRAVENOUS at 05:04

## 2025-04-02 NOTE — DISCHARGE INSTRUCTIONS
Complete the entire antibiotic course as prescribed  Take the prescribed naproxen for pain. For breakthrough pain, you can take the prescribed norco as needed. DO NOT take goody powder and norco at the same time (wait 6 hours), as they both have acetaminophen (tylenol)    Return to the emergency department if you have severe swelling, fevers, difficulty breathing, or for any other concerning symptoms

## 2025-04-02 NOTE — ED PROVIDER NOTES
Encounter Date: 4/2/2025       History     Chief Complaint   Patient presents with    Dental Pain     Since yesterday tooth pain got worse     30 y.o. female with no significant past medical history presents for dental pain.  Patient reports pain in the right upper mouth attributed to a tooth that needs to be extracted per patient.  He reports intermittent pain in that tooth but has been worsening acutely since yesterday.  She denies any fever/chills, difficulty breathing, or facial swelling.  She has an appointment with her dentist on Monday.    The history is provided by the patient and medical records.     Review of patient's allergies indicates:  No Known Allergies  History reviewed. No pertinent past medical history.  Past Surgical History:   Procedure Laterality Date    BREAST BIOPSY       No family history on file.  Social History[1]  Review of Systems   Reason unable to perform ROS: See HPI for relevant ROS.       Physical Exam     Initial Vitals [04/02/25 1543]   BP Pulse Resp Temp SpO2   (!) 177/97 108 18 98.4 °F (36.9 °C) 100 %      MAP       --         Physical Exam    Nursing note and vitals reviewed.  Constitutional:   Alert, normal work of breathing, no acute distress   HENT:   No significant swelling, erythema, or fluctuance noted of the gums or face  No facial swelling, stridor, sublingual swelling   Eyes: Conjunctivae are normal. No scleral icterus.   Cardiovascular:  Normal rate, regular rhythm and intact distal pulses.           Pulmonary/Chest: No respiratory distress.     Neurological: She is alert.   Skin: Skin is warm and dry.         ED Course   Procedures  Labs Reviewed - No data to display       Imaging Results    None          Medications   ketorolac injection 15 mg (15 mg Intramuscular Given 4/2/25 7545)   HYDROcodone-acetaminophen 5-325 mg per tablet 1 tablet (1 tablet Oral Given 4/2/25 1740)     Medical Decision Making  30 y.o. female with no significant past medical history presents  for dental pain  Differentials include dental caries, pulpitis, doubt abscess  Patient presenting with acutely worsening dental has had similar pain in his area in the past.  No evidence of abscess, no airway compromise, patient nontoxic appearing.  She has dentistry follow-up on Monday, treated symptomatically and with a course of Augmentin out of precaution, return precautions were given    Amount and/or Complexity of Data Reviewed  External Data Reviewed: labs and notes.    Risk  Prescription drug management.                                      Clinical Impression:  Final diagnoses:  [K02.9] Pain due to dental caries (Primary)          ED Disposition Condition    Discharge Stable          ED Prescriptions       Medication Sig Dispense Start Date End Date Auth. Provider    amoxicillin-clavulanate 875-125mg (AUGMENTIN) 875-125 mg per tablet Take 1 tablet by mouth 2 (two) times daily. 14 tablet 4/2/2025 -- Cy Rodriguez MD    HYDROcodone-acetaminophen (NORCO) 5-325 mg per tablet Take 1 tablet by mouth every 6 (six) hours as needed for Pain. 12 tablet 4/2/2025 -- Cy Rodriguez MD          Follow-up Information    None            [1]   Social History  Tobacco Use    Smoking status: Never    Smokeless tobacco: Never   Substance Use Topics    Alcohol use: No    Drug use: No        Cy Rodriguez MD  04/02/25 6411

## 2025-04-02 NOTE — ED TRIAGE NOTES
Tammie Child, a 30 y.o. female presents to the ED w/ complaint of bottom right sided dental pain that began yesterday. Patient cannot get to the dentist until Monday. Pt reports not being able to sleep because of the pain.     Triage note:  Chief Complaint   Patient presents with    Dental Pain     Since yesterday tooth pain got worse     Review of patient's allergies indicates:  No Known Allergies        APPEARANCE: awake and alert. PAIN  10/10  SKIN: warm, dry and intact. No breakdown or bruising.  MUSCULOSKELETAL: Patient moving all extremities spontaneously, no obvious swelling or deformities noted. Ambulates independently.  RESPIRATORY: Denies shortness of breath.Respirations unlabored.   CARDIAC: Denies CP, 2+ distal pulses; no peripheral edema  ABDOMEN: S/ND/NT, Denies nausea  : voids spontaneously, denies difficulty  Neurologic: AAO x 4; follows commands equal strength in all extremities; denies numbness/tingling. Denies dizziness

## 2025-04-03 ENCOUNTER — HOSPITAL ENCOUNTER (EMERGENCY)
Facility: HOSPITAL | Age: 30
Discharge: HOME | End: 2025-04-03
Attending: EMERGENCY MEDICINE

## 2025-04-03 VITALS
HEART RATE: 87 BPM | TEMPERATURE: 98 F | WEIGHT: 195 LBS | HEIGHT: 61 IN | SYSTOLIC BLOOD PRESSURE: 153 MMHG | DIASTOLIC BLOOD PRESSURE: 76 MMHG | OXYGEN SATURATION: 100 % | RESPIRATION RATE: 18 BRPM | BODY MASS INDEX: 36.82 KG/M2

## 2025-04-03 DIAGNOSIS — K08.89 DENTALGIA: Primary | ICD-10-CM

## 2025-04-03 PROCEDURE — 64400 NJX AA&/STRD TRIGEMINAL NRV: CPT | Mod: RT

## 2025-04-03 PROCEDURE — 63600175 PHARM REV CODE 636 W HCPCS: Performed by: EMERGENCY MEDICINE

## 2025-04-03 PROCEDURE — 99281 EMR DPT VST MAYX REQ PHY/QHP: CPT | Mod: 25

## 2025-04-03 RX ORDER — LIDOCAINE HYDROCHLORIDE 10 MG/ML
5 INJECTION, SOLUTION EPIDURAL; INFILTRATION; INTRACAUDAL; PERINEURAL
Status: COMPLETED | OUTPATIENT
Start: 2025-04-03 | End: 2025-04-03

## 2025-04-03 RX ADMIN — LIDOCAINE HYDROCHLORIDE 50 MG: 10 SOLUTION INTRAVENOUS at 05:04

## 2025-04-03 NOTE — DISCHARGE INSTRUCTIONS
Continue taking antibiotics and pain meds as prescribed.    Please call your dentist or any dentist that can get you in for tooth removal as that it definitive management. Otherwise, symptoms may return.

## 2025-04-03 NOTE — ED PROVIDER NOTES
Encounter Date: 4/3/2025       History     Chief Complaint   Patient presents with    Dental Pain     D/C'd earlier w/ same complaint. Taking norco and antibx w/ no relief.      HPI  31yo F who presents with dental pain.  She was seen last night for the same. Discharged with hydrocodone-apap and antibiotics. She is still having pain. No fevers. No difficulty breathing or talking or swallowing. No difficulty with neck ROM.  Pain is in R lower molar (tooth #31)  Review of patient's allergies indicates:  No Known Allergies  History reviewed. No pertinent past medical history.  Past Surgical History:   Procedure Laterality Date    BREAST BIOPSY       No family history on file.  Social History[1]  Review of Systems    Physical Exam     Initial Vitals [04/03/25 0453]   BP Pulse Resp Temp SpO2   (!) 153/76 87 18 98.3 °F (36.8 °C) 100 %      MAP       --         Physical Exam    Nursing note and vitals reviewed.  Constitutional: She appears well-developed and well-nourished. No distress.   HENT:   Head: Normocephalic and atraumatic.   Nose: Nose normal. Mouth/Throat: No oropharyngeal exudate.   Oropharynx clear and moist and patent  No drainable abscess  No submandibular swelling  No trismus   Eyes: EOM are normal. Pupils are equal, round, and reactive to light.   Pulmonary/Chest: Breath sounds normal. No respiratory distress.   Abdominal: She exhibits no distension.     Neurological: She is alert and oriented to person, place, and time.   Skin: Skin is warm and dry.         ED Course   Nerve Block    Date/Time: 4/3/2025 5:17 AM  Location procedure was performed: [facility] EMERGENCY DEPARTMENT    Performed by: Guera Cardenas MD  Authorized by: Guera Cardenas MD  Indications: pain relief  Body area: face/mouth  Nerve: inferior alveolar  Laterality: right  Patient position: prone  Needle size: 25 G  Location technique: anatomical landmarks  Local Anesthetic: lidocaine 1% without epinephrine  Anesthetic total: 5 mL  Outcome:  pain unchanged  Patient tolerance: Patient tolerated the procedure well with no immediate complications        Labs Reviewed   HEPATITIS C ANTIBODY   HIV 1 / 2 ANTIBODY          Imaging Results    None          Medications   LIDOcaine (PF) 10 mg/ml (1%) injection 50 mg (50 mg Infiltration Given by Provider 4/3/25 3657)     Medical Decision Making  30-year-old female who presents with ongoing dental pain.  No improvement with pain medicines, anti-inflammatories, or antibiotics.  No signs of Garcia's angina or deep neck space infection.  She is willing to have a dental block done at this point.  We will proceed with dental block.  She is otherwise stable for discharge with continued antibiotics and was informed she needs to follow up with dentist outpatient.    Risk  Prescription drug management.                                      Clinical Impression:  Final diagnoses:  [K08.89] Dentalgia (Primary)                   [1]   Social History  Tobacco Use    Smoking status: Never    Smokeless tobacco: Never   Substance Use Topics    Alcohol use: No    Drug use: No        Guera Cardenas MD  04/03/25 2464

## 2025-04-03 NOTE — ED TRIAGE NOTES
Tammie Child, a 30 y.o. female presents to the ED via pv w/ complaint of dental pain. D/c a few hours ago w/same complaint, states no relief after taking norco and abx.    Triage note:  Chief Complaint   Patient presents with    Dental Pain     D/C'd earlier w/ same complaint. Taking norco and antibx w/ no relief.      Review of patient's allergies indicates:  No Known Allergies  No past medical history on file.